# Patient Record
Sex: FEMALE | Race: WHITE
[De-identification: names, ages, dates, MRNs, and addresses within clinical notes are randomized per-mention and may not be internally consistent; named-entity substitution may affect disease eponyms.]

---

## 2019-12-03 ENCOUNTER — HOSPITAL ENCOUNTER (INPATIENT)
Dept: HOSPITAL 84 - D.SDCHOLD | Age: 54
LOS: 52 days | Discharge: HOME HEALTH | DRG: 470 | End: 2020-01-24
Attending: ORTHOPAEDIC SURGERY | Admitting: ORTHOPAEDIC SURGERY
Payer: COMMERCIAL

## 2019-12-03 VITALS
BODY MASS INDEX: 29.3 KG/M2 | HEIGHT: 63 IN | WEIGHT: 165.35 LBS | BODY MASS INDEX: 29.3 KG/M2 | WEIGHT: 165.35 LBS | BODY MASS INDEX: 29.3 KG/M2 | HEIGHT: 63 IN

## 2019-12-03 DIAGNOSIS — E11.40: ICD-10-CM

## 2019-12-03 DIAGNOSIS — E11.65: ICD-10-CM

## 2019-12-03 DIAGNOSIS — M17.12: Primary | ICD-10-CM

## 2019-12-03 DIAGNOSIS — I10: ICD-10-CM

## 2019-12-03 DIAGNOSIS — E87.1: ICD-10-CM

## 2019-12-03 DIAGNOSIS — D62: ICD-10-CM

## 2020-01-15 LAB
ANION GAP SERPL CALC-SCNC: 10.7 MMOL/L (ref 8–16)
APPEARANCE UR: CLEAR
APTT BLD: 24.6 SECONDS (ref 22.8–39.4)
BACTERIA #/AREA URNS HPF: (no result) /HPF
BASOPHILS NFR BLD AUTO: 0.3 % (ref 0–2)
BILIRUB SERPL-MCNC: NEGATIVE MG/DL
BUN SERPL-MCNC: 11 MG/DL (ref 7–18)
CALCIUM SERPL-MCNC: 9.5 MG/DL (ref 8.5–10.1)
CHLORIDE SERPL-SCNC: 101 MMOL/L (ref 98–107)
CO2 SERPL-SCNC: 33.2 MMOL/L (ref 21–32)
COLOR UR: YELLOW
CREAT SERPL-MCNC: 0.6 MG/DL (ref 0.6–1.3)
EOSINOPHIL NFR BLD: 3.1 % (ref 0–7)
ERYTHROCYTE [DISTWIDTH] IN BLOOD BY AUTOMATED COUNT: 14.4 % (ref 11.5–14.5)
GLUCOSE SERPL-MCNC: 1000 MG/DL
GLUCOSE SERPL-MCNC: 160 MG/DL (ref 74–106)
HCT VFR BLD CALC: 37.4 % (ref 36–48)
HGB BLD-MCNC: 12.3 G/DL (ref 12–16)
IMM GRANULOCYTES NFR BLD: 0.4 % (ref 0–5)
INR PPP: 0.93 (ref 0.85–1.17)
KETONES UR STRIP-MCNC: NEGATIVE MG/DL
LYMPHOCYTES NFR BLD AUTO: 28.4 % (ref 15–50)
MCH RBC QN AUTO: 30 PG (ref 26–34)
MCHC RBC AUTO-ENTMCNC: 32.9 G/DL (ref 31–37)
MCV RBC: 91.2 FL (ref 80–100)
MONOCYTES NFR BLD: 7.4 % (ref 2–11)
NEUTROPHILS NFR BLD AUTO: 60.4 % (ref 40–80)
NITRITE UR-MCNC: NEGATIVE MG/ML
OSMOLALITY SERPL CALC.SUM OF ELEC: 280 MOSM/KG (ref 275–300)
PH UR STRIP: 6 [PH] (ref 5–6)
PLATELET # BLD: 279 10X3/UL (ref 130–400)
PMV BLD AUTO: 9.6 FL (ref 7.4–10.4)
POTASSIUM SERPL-SCNC: 4.9 MMOL/L (ref 3.5–5.1)
PROT UR-MCNC: NEGATIVE MG/DL
PROTHROMBIN TIME: 12.4 SECONDS (ref 11.6–15)
RBC # BLD AUTO: 4.1 10X6/UL (ref 4–5.4)
RBC #/AREA URNS HPF: (no result) /HPF (ref 0–5)
SODIUM SERPL-SCNC: 140 MMOL/L (ref 136–145)
SP GR UR STRIP: 1.01 (ref 1–1.02)
SQUAMOUS #/AREA URNS HPF: (no result) /HPF (ref 0–5)
UROBILINOGEN UR-MCNC: NORMAL MG/DL
WBC # BLD AUTO: 6.9 10X3/UL (ref 4.8–10.8)
WBC #/AREA URNS HPF: (no result) /HPF

## 2020-01-20 VITALS — SYSTOLIC BLOOD PRESSURE: 144 MMHG | DIASTOLIC BLOOD PRESSURE: 74 MMHG

## 2020-01-20 VITALS — SYSTOLIC BLOOD PRESSURE: 132 MMHG | DIASTOLIC BLOOD PRESSURE: 60 MMHG

## 2020-01-20 VITALS — SYSTOLIC BLOOD PRESSURE: 148 MMHG | DIASTOLIC BLOOD PRESSURE: 55 MMHG

## 2020-01-20 VITALS — SYSTOLIC BLOOD PRESSURE: 127 MMHG | DIASTOLIC BLOOD PRESSURE: 88 MMHG

## 2020-01-20 VITALS — DIASTOLIC BLOOD PRESSURE: 70 MMHG | SYSTOLIC BLOOD PRESSURE: 144 MMHG

## 2020-01-20 VITALS — DIASTOLIC BLOOD PRESSURE: 68 MMHG | SYSTOLIC BLOOD PRESSURE: 132 MMHG

## 2020-01-20 VITALS — SYSTOLIC BLOOD PRESSURE: 105 MMHG | DIASTOLIC BLOOD PRESSURE: 47 MMHG

## 2020-01-20 VITALS — DIASTOLIC BLOOD PRESSURE: 60 MMHG | SYSTOLIC BLOOD PRESSURE: 130 MMHG

## 2020-01-20 VITALS — SYSTOLIC BLOOD PRESSURE: 139 MMHG | DIASTOLIC BLOOD PRESSURE: 68 MMHG

## 2020-01-20 VITALS — DIASTOLIC BLOOD PRESSURE: 76 MMHG | SYSTOLIC BLOOD PRESSURE: 142 MMHG

## 2020-01-20 VITALS — SYSTOLIC BLOOD PRESSURE: 138 MMHG | DIASTOLIC BLOOD PRESSURE: 78 MMHG

## 2020-01-20 LAB
APPEARANCE UR: CLEAR
BACTERIA #/AREA URNS HPF: (no result) /HPF
BILIRUB SERPL-MCNC: NEGATIVE MG/DL
COLOR UR: YELLOW
GLUCOSE SERPL-MCNC: 100 MG/DL
KETONES UR STRIP-MCNC: NEGATIVE MG/DL
MUCOUS THREADS #/AREA URNS LPF: (no result) /LPF
NITRITE UR-MCNC: NEGATIVE MG/ML
PH UR STRIP: 7 [PH] (ref 5–6)
PROT UR-MCNC: NEGATIVE MG/DL
RBC #/AREA URNS HPF: (no result) /HPF (ref 0–5)
SP GR UR STRIP: 1.01 (ref 1–1.02)
SQUAMOUS #/AREA URNS HPF: (no result) /HPF (ref 0–5)
UROBILINOGEN UR-MCNC: NORMAL MG/DL
WBC #/AREA URNS HPF: (no result) /HPF

## 2020-01-20 PROCEDURE — 0SRD0J9 REPLACEMENT OF LEFT KNEE JOINT WITH SYNTHETIC SUBSTITUTE, CEMENTED, OPEN APPROACH: ICD-10-PCS | Performed by: ORTHOPAEDIC SURGERY

## 2020-01-20 NOTE — NUR
CALLED FOR POST OP XRAY. ICE APPLIED AS ORDERED. PT STABLE AND AWAKE
READY TO GO TO HER ROOM WILL CALL REPORT AND TRANSFER.

## 2020-01-20 NOTE — NUR
PATIENT ADMITTED TO ROOM 2204. ADMISSION COMPLETE. DRSG TO LEFT KNEE C/D/I. NO
C/O PAIN. BED ALARM ON.  AT BEDSIDE. CALL BELL AND PERSONAL ITEMS IN
REACH. WILL CONTINUE TO MONITOR.

## 2020-01-20 NOTE — NUR
RESTING IN BED. ASSISTED TO USE BEDPAN. POST OP VITALS REMAIN STABLE. DENIES
NEEDS.  AT BEDSIDE. WILL CONTINUE TO MONITOR.

## 2020-01-20 NOTE — NUR
PATIENT ALERT AND ORIENTED SITTING
UP IN BED. NO ACUTE DISTRESS NOTED AT THIS TIME. PATIENT ASKED
FOR A PAIN PILL, BUT HAS NO OTHER NEEDS AT THIS TIME. IV R HAND, 1/2NS @100,
NO REDNESS OR SWELLING NOTED. BED RAILS X2. CALL LIGHT AND BEDSIDE TABLE
WITHIN REACH.

## 2020-01-20 NOTE — NUR
RESTING IN BED. DENIES NEEDS. FALL PRECAUTIONS IN PLACE. BED LOW. CALL CHEN
AND PERSONAL ITEMS IN [facility].

## 2020-01-21 VITALS — SYSTOLIC BLOOD PRESSURE: 115 MMHG | DIASTOLIC BLOOD PRESSURE: 61 MMHG

## 2020-01-21 VITALS — DIASTOLIC BLOOD PRESSURE: 56 MMHG | SYSTOLIC BLOOD PRESSURE: 98 MMHG

## 2020-01-21 VITALS — DIASTOLIC BLOOD PRESSURE: 66 MMHG | SYSTOLIC BLOOD PRESSURE: 151 MMHG

## 2020-01-21 VITALS — SYSTOLIC BLOOD PRESSURE: 138 MMHG | DIASTOLIC BLOOD PRESSURE: 59 MMHG

## 2020-01-21 VITALS — SYSTOLIC BLOOD PRESSURE: 143 MMHG | DIASTOLIC BLOOD PRESSURE: 63 MMHG

## 2020-01-21 VITALS — DIASTOLIC BLOOD PRESSURE: 63 MMHG | SYSTOLIC BLOOD PRESSURE: 154 MMHG

## 2020-01-21 LAB
ANION GAP SERPL CALC-SCNC: 10.1 MMOL/L (ref 8–16)
BASOPHILS NFR BLD AUTO: 0.1 % (ref 0–2)
BUN SERPL-MCNC: 14 MG/DL (ref 7–18)
CALCIUM SERPL-MCNC: 7.4 MG/DL (ref 8.5–10.1)
CHLORIDE SERPL-SCNC: 100 MMOL/L (ref 98–107)
CO2 SERPL-SCNC: 26.9 MMOL/L (ref 21–32)
CREAT SERPL-MCNC: 0.7 MG/DL (ref 0.6–1.3)
EOSINOPHIL NFR BLD: 0.4 % (ref 0–7)
ERYTHROCYTE [DISTWIDTH] IN BLOOD BY AUTOMATED COUNT: 14 % (ref 11.5–14.5)
GLUCOSE SERPL-MCNC: 273 MG/DL (ref 74–106)
HCT VFR BLD CALC: 26.3 % (ref 36–48)
HGB BLD-MCNC: 8.6 G/DL (ref 12–16)
IMM GRANULOCYTES NFR BLD: 0.3 % (ref 0–5)
LYMPHOCYTES NFR BLD AUTO: 16.2 % (ref 15–50)
MAGNESIUM SERPL-MCNC: 1.3 MG/DL (ref 1.8–2.4)
MCH RBC QN AUTO: 29.8 PG (ref 26–34)
MCHC RBC AUTO-ENTMCNC: 32.7 G/DL (ref 31–37)
MCV RBC: 91 FL (ref 80–100)
MONOCYTES NFR BLD: 12.8 % (ref 2–11)
NEUTROPHILS NFR BLD AUTO: 70.2 % (ref 40–80)
OSMOLALITY SERPL CALC.SUM OF ELEC: 276 MOSM/KG (ref 275–300)
PLATELET # BLD: 202 10X3/UL (ref 130–400)
PMV BLD AUTO: 9.6 FL (ref 7.4–10.4)
POTASSIUM SERPL-SCNC: 4 MMOL/L (ref 3.5–5.1)
RBC # BLD AUTO: 2.89 10X6/UL (ref 4–5.4)
SODIUM SERPL-SCNC: 133 MMOL/L (ref 136–145)
WBC # BLD AUTO: 7.5 10X3/UL (ref 4.8–10.8)

## 2020-01-21 NOTE — NUR
TAKEN OFF CPM AT THIS TIME. ASSISTED ONTO BEDPAN. DENIES PAIN. SCD PLACED ON
RLE. PLEXI BOOT PLACED TO LT FOOT. DRSG TO LT KNEE WITH ACE WRAP IS C/D/I. BED
ALARM ON FOR PT SAFETY. NS @ 10 ML/HR INFUSING IN RT HAND. ALERT AND ORIENTED
X4. C/O NAUSEA. SR ELEVATED X2. CL IN REACH.

## 2020-01-21 NOTE — NUR
SPOKE WITH NOLBERTO AN TO GET LOW RESISTANCE SLIDING SCALE AND HUMALOG FOR
PATIENT. STATES CHECK BS AC/HS. BLOOD SUGAR 390 AFTER CHECKING TWICE. 10 UNITS
INSULIN GIVEN.

## 2020-01-21 NOTE — NUR
ALERT AND ORIENTED. LUNGS CLEAR BILATERALLY. HEART SOUNDS S1 AND S2 HEARD IN
ALL FIELDS. BOWEL SOUNDS ACTIVE X 4. DRSG TO LEFT KNEE C/D/I. REQUESTED AND
GIVEN ICE PACK FOR KNEE. USING CPM MACHINE. TO COME OFF AT 0930. IV TO RIGHT
HAND PATENT WITHOUT REDNESS. DENIES NEEDS. BED LOW. FALL PRECAUTIONS IN PLACE.
CALL BELL AND PERSONAL ITEMS IN REACH. WILL CONTINUE TO MONITOR.

## 2020-01-22 VITALS — DIASTOLIC BLOOD PRESSURE: 64 MMHG | SYSTOLIC BLOOD PRESSURE: 104 MMHG

## 2020-01-22 VITALS — SYSTOLIC BLOOD PRESSURE: 145 MMHG | DIASTOLIC BLOOD PRESSURE: 70 MMHG

## 2020-01-22 VITALS — SYSTOLIC BLOOD PRESSURE: 130 MMHG | DIASTOLIC BLOOD PRESSURE: 84 MMHG

## 2020-01-22 VITALS — DIASTOLIC BLOOD PRESSURE: 65 MMHG | SYSTOLIC BLOOD PRESSURE: 148 MMHG

## 2020-01-22 VITALS — DIASTOLIC BLOOD PRESSURE: 77 MMHG | SYSTOLIC BLOOD PRESSURE: 129 MMHG

## 2020-01-22 VITALS — DIASTOLIC BLOOD PRESSURE: 82 MMHG | SYSTOLIC BLOOD PRESSURE: 135 MMHG

## 2020-01-22 LAB
ANION GAP SERPL CALC-SCNC: 13.6 MMOL/L (ref 8–16)
BASOPHILS NFR BLD AUTO: 0.1 % (ref 0–2)
BUN SERPL-MCNC: 8 MG/DL (ref 7–18)
CALCIUM SERPL-MCNC: 8.3 MG/DL (ref 8.5–10.1)
CHLORIDE SERPL-SCNC: 96 MMOL/L (ref 98–107)
CO2 SERPL-SCNC: 26.6 MMOL/L (ref 21–32)
CREAT SERPL-MCNC: 0.5 MG/DL (ref 0.6–1.3)
EOSINOPHIL NFR BLD: 0 % (ref 0–7)
ERYTHROCYTE [DISTWIDTH] IN BLOOD BY AUTOMATED COUNT: 13.6 % (ref 11.5–14.5)
GLUCOSE SERPL-MCNC: 231 MG/DL (ref 74–106)
HCT VFR BLD CALC: 26.1 % (ref 36–48)
HGB BLD-MCNC: 8.6 G/DL (ref 12–16)
IMM GRANULOCYTES NFR BLD: 0.4 % (ref 0–5)
LYMPHOCYTES NFR BLD AUTO: 14.4 % (ref 15–50)
MAGNESIUM SERPL-MCNC: 1.4 MG/DL (ref 1.8–2.4)
MCH RBC QN AUTO: 29.8 PG (ref 26–34)
MCHC RBC AUTO-ENTMCNC: 33 G/DL (ref 31–37)
MCV RBC: 90.3 FL (ref 80–100)
MONOCYTES NFR BLD: 11.3 % (ref 2–11)
NEUTROPHILS NFR BLD AUTO: 73.8 % (ref 40–80)
OSMOLALITY SERPL CALC.SUM OF ELEC: 269 MOSM/KG (ref 275–300)
PLATELET # BLD: 232 10X3/UL (ref 130–400)
PMV BLD AUTO: 9.9 FL (ref 7.4–10.4)
POTASSIUM SERPL-SCNC: 4.2 MMOL/L (ref 3.5–5.1)
RBC # BLD AUTO: 2.89 10X6/UL (ref 4–5.4)
SODIUM SERPL-SCNC: 132 MMOL/L (ref 136–145)
WBC # BLD AUTO: 11.2 10X3/UL (ref 4.8–10.8)

## 2020-01-22 NOTE — NUR
ALERT RESTING IN BED CPM IN USE, DENIES PAIN OR NEEDS AT THIS TIME, SEE SHIFT
ASSESSMENT, CALL LIGHT IN REACH

## 2020-01-23 VITALS — DIASTOLIC BLOOD PRESSURE: 70 MMHG | SYSTOLIC BLOOD PRESSURE: 135 MMHG

## 2020-01-23 VITALS — SYSTOLIC BLOOD PRESSURE: 146 MMHG | DIASTOLIC BLOOD PRESSURE: 67 MMHG

## 2020-01-23 VITALS — SYSTOLIC BLOOD PRESSURE: 133 MMHG | DIASTOLIC BLOOD PRESSURE: 70 MMHG

## 2020-01-23 VITALS — DIASTOLIC BLOOD PRESSURE: 70 MMHG | SYSTOLIC BLOOD PRESSURE: 140 MMHG

## 2020-01-23 VITALS — SYSTOLIC BLOOD PRESSURE: 147 MMHG | DIASTOLIC BLOOD PRESSURE: 66 MMHG

## 2020-01-23 LAB
ANION GAP SERPL CALC-SCNC: 8.3 MMOL/L (ref 8–16)
BASOPHILS NFR BLD AUTO: 0.2 % (ref 0–2)
BUN SERPL-MCNC: 8 MG/DL (ref 7–18)
CALCIUM SERPL-MCNC: 8.6 MG/DL (ref 8.5–10.1)
CHLORIDE SERPL-SCNC: 97 MMOL/L (ref 98–107)
CO2 SERPL-SCNC: 31.6 MMOL/L (ref 21–32)
CREAT SERPL-MCNC: 0.5 MG/DL (ref 0.6–1.3)
EOSINOPHIL NFR BLD: 0 % (ref 0–7)
ERYTHROCYTE [DISTWIDTH] IN BLOOD BY AUTOMATED COUNT: 13.5 % (ref 11.5–14.5)
GLUCOSE SERPL-MCNC: 161 MG/DL (ref 74–106)
HCT VFR BLD CALC: 24 % (ref 36–48)
HGB BLD-MCNC: 8 G/DL (ref 12–16)
IMM GRANULOCYTES NFR BLD: 0.6 % (ref 0–5)
LYMPHOCYTES NFR BLD AUTO: 12 % (ref 15–50)
MAGNESIUM SERPL-MCNC: 1.5 MG/DL (ref 1.8–2.4)
MCH RBC QN AUTO: 30.1 PG (ref 26–34)
MCHC RBC AUTO-ENTMCNC: 33.3 G/DL (ref 31–37)
MCV RBC: 90.2 FL (ref 80–100)
MONOCYTES NFR BLD: 10.4 % (ref 2–11)
NEUTROPHILS NFR BLD AUTO: 76.8 % (ref 40–80)
OSMOLALITY SERPL CALC.SUM OF ELEC: 266 MOSM/KG (ref 275–300)
PLATELET # BLD: 252 10X3/UL (ref 130–400)
PMV BLD AUTO: 9.7 FL (ref 7.4–10.4)
POTASSIUM SERPL-SCNC: 3.9 MMOL/L (ref 3.5–5.1)
RBC # BLD AUTO: 2.66 10X6/UL (ref 4–5.4)
SODIUM SERPL-SCNC: 133 MMOL/L (ref 136–145)
WBC # BLD AUTO: 11.8 10X3/UL (ref 4.8–10.8)

## 2020-01-23 NOTE — MORECARE
CASE MANAGEMENT DISCHARGE SUMMARY
 
 
PATIENT: YNES COLEMAN                      UNIT: [id number]
ACCOUNT#: [id number]                       ADM DATE: 20
AGE: 54     : 61  SEX: F            ROOM/BED: D.2204    
AUTHOR: AKUA,DOC                             PHYSICIAN:                               
 
REFERRING PHYSICIAN: NAHED ALVARES MD        
DATE OF SERVICE: 20
Discharge Plan
 
 
Patient Name: YNES COLEMAN
Facility: [facility]:[place]
Encounter #: [id number]
Medical Record #: [id number]
: 1965
Planned Disposition: Home Health Service
Anticipated Discharge Date: 
 
Discharge Date: 
Expected LOS: 
Initial Reviewer: [email or web address]
Initial Review Date: 2020
Generated: 20   1:22 pm 
Comments
 
DCP- Discharge Planning
 
Updated by [id number]: Yvonne Arnold on 20  11:22 am CT
Patient Name: YNES COLEMAN                                     
Admission Status: Elective   
Accout number: [id number]                              
Admission Date: 2020   
: 1965                                                        
Admission Diagnosis:   
Attending: NAHED ALVARES                                               
Current LOS:  3   
  
Anticipated DC Date:    
Planned Disposition: Home Health Service   
Primary Insurance: [facility]   
  
  
Discharge Planning Comments:   
  
CM met with patient to complete initial dc planning assessment.  CM educated 
patient on the CM role and verbal consent given by patient to complete 
assessment.   Patient lives at home with her spouse Deandre.  At discharge 
 
patient plans to return home and feels this is a safe discharge. Patient 
states she has adequate assistance and support from her spouse to return home.
 CM discussed availability of home health, and patient has chosen  [facility].  CM will continue to follow and will assist as needed with dc 
plans/needs.    
  
  
  
  
: Yvonne Arnold
DCP- Discharge Planning
 
Updated by [id number]: Yvonne Arnold on 20   7:21 am CT
CM spoke with patient about H/H. H/H explained- Patient chose [facility]. Copy placed on chart.
DCP- Discharge Planning
 
Updated by [id number]: Yvonne Arnold on 20   2:43 pm CT
Cm spoke with patient about HH.  Patient reluctant to choose facility without 
.  Called Deandre () but unable to reach.  Will attempt 19 
for home health decision.
 DCPIA - Discharge Planning Initial Assessment
 
Updated by [id number]: Yvonne Arnold on 20  12:19 pm
*  Is the patient Alert and Oriented?
Yes
*  How many steps to enter\exit or inside your home? 1/0 *  [facility] *  Pharmacy [facility]
*  Preadmission Environment
Home with Family
*  ADLs
Independent
*  Equipment
Cane
Elevated Toliet Seat
Glucometer
Rolling Walker
Walker
*  List name and contact numbers for known caregivers / representatives who 
currently or will assist patient after discharge:
Deandre Coleman  [phone]
 
*  Verbal permission to speak to the caregivers and representatives has been 
obtained from the patient.
Yes
*  Community resources currently utilized
None
*  Additional services required to return to the preadmission environment?
Yes
*  Can the patient safely return to the preadmission environment?
Yes
*  Has this patient been hospitalized within the prior 30 days at any 
 
hospital?
No
 
 
 
 
 
Coverage Notice
 
Reviewer: [email or web address] - Yvonne Arnold
 
Notice Issued Date-Time: 2020   7:40
Notice Type: Patient Choice Letter
 
Notice Delivered To: Patient
Relationship to Patient: 
Representative Name: 
 
Delivery Method: HAND - Hand Delivered
Juhi Days:
Prior Verbal Notification: 
 
Recipient Understood Notice: Yes
Recipient Signature: Yes
Med Rec Note Co-signed by Attending:
 
Coverage Notice Comment:  patient chooses [facility]
 
Last DP export: 20  11:07 a
Patient Name: YNES COLEMAN
Encounter #: [id number]
Page [id number]
 
 
 
 
 
Electronically Signed by KELVIN FATIMA on 20 at 1223
 
 
 
 
 
 
**All edits/amendments must be made on the electronic document**
 
DICTATION DATE: 20 122     : IRINEO  20 1222     
RPT#: [id number]                                DC DATE:        
                                               STATUS: ADM IN  
[facility]
[address] [place] [address]
***END OF REPORT***

## 2020-01-23 NOTE — MORECARE
CASE MANAGEMENT DISCHARGE SUMMARY
 
 
PATIENT: YNES COLEMAN                      UNIT: [id number]
ACCOUNT#: [id number]                       ADM DATE: 20
AGE: 54     : 61  SEX: F            ROOM/BED: D.2204    
AUTHOR: AKUA,DOC                             PHYSICIAN:                               
 
REFERRING PHYSICIAN: NAHED ALVARES MD        
DATE OF SERVICE: 20
Discharge Plan
 
 
Patient Name: YNES COLEMAN
Facility: [facility]:[place]
Encounter #: [id number]
Medical Record #: [id number]
: 1965
Planned Disposition: Home Health Service
Anticipated Discharge Date: 
 
Discharge Date: 
Expected LOS: 
Initial Reviewer: [id number]
Initial Review Date: 2020
Generated: 20   4:47 pm 
Comments
 
DCP- Discharge Planning
 
Updated by [id number]: Yvonne Arnold on 20  11:22 am CT
Patient Name: YNES COLEMAN                                     
Admission Status: Elective   
Accout number: [id number]                              
Admission Date: 2020   
: 1965                                                        
Admission Diagnosis:   
Attending: NAHED ALVARES                                               
Current LOS:  3   
  
Anticipated DC Date:    
Planned Disposition: Home Health Service   
Primary Insurance: [facility]   
  
  
Discharge Planning Comments:   
  
CM met with patient to complete initial dc planning assessment.  CM educated 
patient on the CM role and verbal consent given by patient to complete 
assessment.   Patient lives at home with her spouse Deandre.  At discharge 
 
patient plans to return home and feels this is a safe discharge. Patient 
states she has adequate assistance and support from her spouse to return home.
 CM discussed availability of home health, and patient has chosen  [facility].  CM will continue to follow and will assist as needed with dc 
plans/needs.    
  
  
  
  
: Yvonne Arnold
DCP- Discharge Planning
 
Updated by [id number]: Yvonne Arnold on 20   7:21 am CT
CM spoke with patient about H/H. H/H explained- Patient chose [facility]. Copy placed on chart.
DCP- Discharge Planning
 
Updated by [id number]: Yvonne Arnold on 20   2:43 pm CT
Cm spoke with patient about HH.  Patient reluctant to choose facility without 
.  Called Deandre () but unable to reach.  Will attempt 19 
for home health decision.
 DCPIA - Discharge Planning Initial Assessment
 
Updated by [id number]: Yvonne Arnold on 20  12:19 pm
*  Is the patient Alert and Oriented?
Yes
*  How many steps to enter\exit or inside your home? 1/0 *  [facility] *  Pharmacy [facility]
*  Preadmission Environment
Home with Family
*  ADLs
Independent
*  Equipment
Cane
Elevated Toliet Seat
Glucometer
Rolling Walker
Walker
*  List name and contact numbers for known caregivers / representatives who 
currently or will assist patient after discharge:
Deandre Coleman  [phone]
 
*  Verbal permission to speak to the caregivers and representatives has been 
obtained from the patient.
Yes
*  Community resources currently utilized
None
*  Additional services required to return to the preadmission environment?
Yes
*  Can the patient safely return to the preadmission environment?
Yes
*  Has this patient been hospitalized within the prior 30 days at any 
 
hospital?
No
 
External Providers
External Provider: [facility]
 
Next Contact Date: 
Service Request Date: 
Service Type: 
Resolution: 
 
Reviewer: 
Comments: 
 
 
 
 
Coverage Notice
 
Reviewer: [id number] - Yvonne Arnold
 
Notice Issued Date-Time: 2020   7:40
Notice Type: Patient Choice Letter
 
Notice Delivered To: Patient
Relationship to Patient: 
Representative Name: 
 
Delivery Method: HAND - Hand Delivered
Juhi Days:
Prior Verbal Notification: 
 
Recipient Understood Notice: Yes
Recipient Signature: Yes
Med Rec Note Co-signed by Attending:
 
Coverage Notice Comment:  [facility]
 
Last DP export: 20  11:23 a
Patient Name: YNES COLEMAN
 
Encounter #: [id number]
Page [id number]
 
 
 
 
 
Electronically Signed by KELVIN FATIMA on 20 at 1547
 
 
 
 
 
 
**All edits/amendments must be made on the electronic document**
 
DICTATION DATE: 20 1547     : IRINEO  20 1547     
RPT#: [id number]                                DC DATE:        
                                               STATUS: ADM IN  
[facility]
[address] [place] [address]
***END OF REPORT***

## 2020-01-23 NOTE — NUR
ALERT RESTING IN BED, CPM IN USE, DENIES PAIN OR NEEDS AT THIS TIME, SEE SHIFT
ASSESSMENT, CAll light in reach

## 2020-01-23 NOTE — MORECARE
CASE MANAGEMENT DISCHARGE SUMMARY
 
 
PATIENT: YNES GONZALES                      UNIT: [id number]
ACCOUNT#: [id number]                       ADM DATE: 20
AGE: 54     : 61  SEX: F            ROOM/BED: D.2204    
AUTHOR: KELVIN FATIMA                             PHYSICIAN:                               
 
REFERRING PHYSICIAN: NAHED ALVARES MD        
DATE OF SERVICE: 20
Discharge Plan
 
 
Patient Name: YNES GONZALES
[facility]
Encounter #: [id number]
Medical Record #: [id number]
: 1965
Planned Disposition: Home Health Service
Anticipated Discharge Date: 
 
Discharge Date: 
Expected LOS: 
Initial Reviewer: [id number]
Initial Review Date: 2020
Generated: 20  12:57 pm 
Comments
 
DCP- Discharge Planning
 
Updated by [id number]: Yvonne Arnold on 20   7:21 am CT
CM spoke with patient about H/H. H/H explained- Patient chose [facility]. Copy placed on chart.
DCP- Discharge Planning
 
Updated by [id number]: Yvonne Arnold on 20   2:43 pm CT
Cm spoke with patient about HH.  Patient reluctant to choose facility without 
.  Called Deandre () but unable to reach.  Will attempt 19 
for home health decision.
  
 
 
 
 
 
 
Coverage Notice
 
Reviewer: [id number] - Yvonne Arnold
 
 
Notice Issued Date-Time: 2020   7:40
Notice Type: Patient Choice Letter
 
Notice Delivered To: Patient
Relationship to Patient: 
Representative Name: 
 
Delivery Method: HAND - Hand Delivered
Juhi Days:
Prior Verbal Notification: 
 
Recipient Understood Notice: Yes
Recipient Signature: Yes
Med Rec Note Co-signed by Attending:
 
Coverage Notice Comment:  [facility] chooses [facility]
Patient Name: YNES GONZALES
Encounter #: [id number]
Page [id number]
 
 
 
 
 
Electronically Signed by KELVIN FATIMA on 20 at 1158
 
 
 
 
 
 
**All edits/amendments must be made on the electronic document**
 
DICTATION DATE: 20 1157     : IRINEO  20 1157     
RPT#: [id number]                                DC DATE:        
                                               STATUS: ADM IN  
[facility]
[address] [place] [address]
***END OF REPORT***

## 2020-01-23 NOTE — OP
PATIENT NAME:  YNES GONZALES                            MEDICAL RECORD: [id number]
:[birth year]                                             LOCATION:D.MS [id number]
                                                         ADMISSION DATE:20
SURGEON:  NAHED ALVARES MD        
 
 
DATE OF OPERATION:  2020
 
PREOPERATIVE DIAGNOSIS:  Degenerative arthritis, left knee.
 
POSTOPERATIVE DIAGNOSIS:  Degenerative arthritis, left knee.
 
PROCEDURE:  Left total knee arthroplasty.
 
SURGEON:  Nahed Alvares MD
 
ASSISTANT:  LEIGH Camarillo
 
INTRAOPERATIVE COMPLICATIONS:  None.
 
SUMMARY OF PATHOLOGIC FINDINGS:  The patient had severe medial compartment
osteoarthritis with patellofemoral arthrosis.
 
IMPLANTS USED:  [place] triathlon total knee arthroplasty, press fit a size 3
tibial baseplate, size 3 distal femur, size 3 x 11 press fit patellar component,
and a size 11 tibial insert.  Again, press fit.
 
OPERATIVE SUMMARY IN DETAIL:  After obtaining the appropriate preoperative
orthopedic surgery consent as well as anesthetic consultation ,evaluation, and
clearance, the patient was brought to the operating room and placed on the
operating table in supine position.  After general laryngeal mask airway was
administered, tourniquet was placed in the proximal aspect of left lower
extremity.  Left lower extremity was prepped and draped in routine sterile
fashion.  At this time, the appropriate timeout was taken.  Based upon the
patient's unique identifiers and agreed upon by all.  The leg was elevated and
exsanguinated, tourniquet was inflated to 350 mmHg.  Routine midline incision
was taken down for paramedian arthrotomy.  The patella was everted and soft
tissue excision was done in the usual fashion.  Distal intramedullary guide hole
was created for distal femoral cuts.  Having completed this, the proximal tibia
was completely exposed.  The residual soft tissue meniscotome were removed and
proximal tibial cut was made.  Having completed this, appropriate measurements
were taken.  Chamfer cuts made on the distal femur.  Trials were put in
corresponding to the above final implants, taken through range of motion and
found to be stable in all planes.  Distal femur and proximal tibial preparations
were made for press fitting.  At this point, the arthritic patellar surface was
excised and again prepared for press fit of the patellar resurfacing component. 
The entire knee cavity was irrigated and the bone ends were dried.  Final
components were put into place with good fit and fill knee was taken through
range of motion and found to be stable in all planes.  A gram of vancomycin, a
gram of tobramycin was placed into the joint.  The paramedian arthrotomy was
closed with #2 Ethibond by Mart Tapia.  Following this, he closed the skin with
#1 Vicryl, #2 Vicryl, and skin staples.  Sterile dressings were applied. 
Tourniquet was deflated.  The patient was awakened, taken to the recovery room
in stable condition.  All final needle and sponge counts were correct.
 
TRANSINT:[id number] Voice Confirmation ID: [id number] DOCUMENT ID: [id number]
 
 
 
OPERATIVE REPORT                               [id number]    YNES GONZALES MD, NAHED NARANJO        
 
 
 
Electronically Signed by NAHED ALVARES MD on 20 at 0820
 
 
 
 
 
 
 
 
 
 
 
 
 
 
 
 
 
 
 
 
 
 
 
 
 
 
 
 
 
 
 
 
 
 
 
 
 
 
 
 
 
CC:                                                             [phone]
DICTATION DATE: 20 0936     :     20 1220      ADM IN  
                                                                              
[place] [id number] [place] [id number]

## 2020-01-23 NOTE — MORECARE
CASE MANAGEMENT DISCHARGE SUMMARY
 
 
PATIENT: YNES GONZALES                      UNIT: [id number]
ACCOUNT#: [id number]                       ADM DATE: 20
AGE: 54     : 61  SEX: F            ROOM/BED: D.2204    
AUTHOR: KELVIN FATIMA                             PHYSICIAN:                               
 
REFERRING PHYSICIAN: NAHED ALVARES MD        
DATE OF SERVICE: 20
Discharge Plan
 
 
Patient Name: YNES GONZALES
[facility]
Encounter #: [id number]
Medical Record #: [id number]
: 1965
Planned Disposition: Home Health Service
Anticipated Discharge Date: 
 
Discharge Date: 
Expected LOS: 
Initial Reviewer: [id number]
Initial Review Date: 2020
Generated: 20   1:06 pm 
Comments
 
DCP- Discharge Planning
 
Updated by [id number]: Yvonne Arnold on 20   7:21 am CT
CM spoke with patient about H/H. H/H explained- Patient chose [facility]. Copy placed on chart.
DCP- Discharge Planning
 
Updated by [id number]: Yvonne Arnold on 20   2:43 pm CT
Cm spoke with patient about HH.  Patient reluctant to choose facility without 
.  Called Deandre () but unable to reach.  Will attempt 19 
for home health decision.
  
 
 
 
 
 
 
Coverage Notice
 
Reviewer: [email or web address] - Yvonne Arnold
 
 
Notice Issued Date-Time: 2020   7:40
Notice Type: Patient Choice Letter
 
Notice Delivered To: Patient
Relationship to Patient: 
Representative Name: 
 
Delivery Method: HAND - Hand Delivered
Juhi Days:
Prior Verbal Notification: 
 
Recipient Understood Notice: Yes
Recipient Signature: Yes
Med Rec Note Co-signed by Attending:
 
Coverage Notice Comment:  patient chooses [facility]
 
Last DP export: 20  10:58 a
Patient Name: YNES GONZALES
Encounter #: [id number]
Page [phone]
 
 
 
 
 
Electronically Signed by KELVIN FATIMA on 20 at 1207
 
 
 
 
 
 
**All edits/amendments must be made on the electronic document**
 
DICTATION DATE: 20 1206     : IRINEO  20 1206     
RPT#: [id number]                                DC DATE:        
                                               STATUS: ADM IN  
[facility]
[address] [place] [address]
***END OF REPORT***

## 2020-01-23 NOTE — NUR
PT RESTING IN BED WITH CPM TO LEFT LOWER EXTREMITY.  RATES PAIN 3/10 AT THIS
TIME.  DRESSING C/D/I TO LEFT LOWER EXTREMITY.  SALINE LOC TO RIGHT HAND, SITE
WITHOUT REDNESS OR EDEMA.  DENIES FURTHER NEEDS AT THIS TIME.  CL WITHIN
REACH.  ENCOURAGED TO CALL WITH NEEDS.  CONTINUE POC

## 2020-01-24 VITALS — DIASTOLIC BLOOD PRESSURE: 74 MMHG | SYSTOLIC BLOOD PRESSURE: 110 MMHG

## 2020-01-24 VITALS — SYSTOLIC BLOOD PRESSURE: 128 MMHG | DIASTOLIC BLOOD PRESSURE: 64 MMHG

## 2020-01-24 LAB
ANION GAP SERPL CALC-SCNC: 8.9 MMOL/L (ref 8–16)
BASOPHILS NFR BLD AUTO: 0.1 % (ref 0–2)
BUN SERPL-MCNC: 10 MG/DL (ref 7–18)
CALCIUM SERPL-MCNC: 8.5 MG/DL (ref 8.5–10.1)
CHLORIDE SERPL-SCNC: 98 MMOL/L (ref 98–107)
CO2 SERPL-SCNC: 32.1 MMOL/L (ref 21–32)
CREAT SERPL-MCNC: 0.7 MG/DL (ref 0.6–1.3)
EOSINOPHIL NFR BLD: 0.5 % (ref 0–7)
ERYTHROCYTE [DISTWIDTH] IN BLOOD BY AUTOMATED COUNT: 13.9 % (ref 11.5–14.5)
GLUCOSE SERPL-MCNC: 190 MG/DL (ref 74–106)
HCT VFR BLD CALC: 28 % (ref 36–48)
HGB BLD-MCNC: 9.3 G/DL (ref 12–16)
IMM GRANULOCYTES NFR BLD: 0.8 % (ref 0–5)
LYMPHOCYTES NFR BLD AUTO: 18.3 % (ref 15–50)
MAGNESIUM SERPL-MCNC: 1.6 MG/DL (ref 1.8–2.4)
MCH RBC QN AUTO: 29.9 PG (ref 26–34)
MCHC RBC AUTO-ENTMCNC: 33.2 G/DL (ref 31–37)
MCV RBC: 90 FL (ref 80–100)
MONOCYTES NFR BLD: 9.5 % (ref 2–11)
NEUTROPHILS NFR BLD AUTO: 70.8 % (ref 40–80)
OSMOLALITY SERPL CALC.SUM OF ELEC: 273 MOSM/KG (ref 275–300)
PLATELET # BLD: 327 10X3/UL (ref 130–400)
PMV BLD AUTO: 9.5 FL (ref 7.4–10.4)
POTASSIUM SERPL-SCNC: 4 MMOL/L (ref 3.5–5.1)
RBC # BLD AUTO: 3.11 10X6/UL (ref 4–5.4)
SODIUM SERPL-SCNC: 135 MMOL/L (ref 136–145)
WBC # BLD AUTO: 10.3 10X3/UL (ref 4.8–10.8)

## 2020-01-24 NOTE — MORECARE
CASE MANAGEMENT DISCHARGE SUMMARY
 
 
PATIENT: YNES COLEMAN                      UNIT: [id number]
ACCOUNT#: [id number]                       ADM DATE: 20
AGE: 54     : 61  SEX: F            ROOM/BED: D.2204    
AUTHOR: AKUA,DOC                             PHYSICIAN:                               
 
REFERRING PHYSICIAN: NAHED ALVARES MD        
DATE OF SERVICE: 20
Discharge Plan
 
 
Patient Name: YNES COLEMAN
Facility: [facility]:[place]
Encounter #: [id number]
Medical Record #: [id number]
: 1965
Planned Disposition: Home Health Service
Anticipated Discharge Date: 
 
Discharge Date: 2020
Expected LOS: 
Initial Reviewer: [id number]
Initial Review Date: 2020
Generated: 20   5:29 pm 
Comments
 
DCP- Discharge Planning
 
Updated by [id number]: Jaci Young on 20  10:43 am CT
PATIENT DISCHARGED HOME TODAY WITH [facility]. I HAVE FAXED 
CLINICALS TO [facility] AND NOTIFIED NELLY.
DCP- Discharge Planning
 
Updated by [id number]: Yvonne Arnold on 20  11:22 am CT
Patient Name: YNES COLEMAN                                     
Admission Status: Elective   
Accout number: [id number]                              
Admission Date: 2020   
: 1965                                                        
Admission Diagnosis:   
Attending: NAHED ALVARES                                               
Current LOS:  3   
  
Anticipated DC Date:    
Planned Disposition: Home Health Service   
Primary Insurance: [facility]   
  
  
 
Discharge Planning Comments:   
  
CM met with patient to complete initial dc planning assessment.  CM educated 
patient on the CM role and verbal consent given by patient to complete 
assessment.   Patient lives at home with her spouse Deandre.  At discharge 
patient plans to return home and feels this is a safe discharge. Patient 
states she has adequate assistance and support from her spouse to return home.
 CM discussed availability of home health, and patient has chosen  [facility].  CM will continue to follow and will assist as needed with dc 
plans/needs.    
  
  
  
  
: Yvonne Arnold
DCP- Discharge Planning
 
Updated by [id number]: Yvonne Arnold on 20   7:21 am CT
CM spoke with patient about H/H. H/H explained- Patient chose [facility]. Copy placed on chart.
DCP- Discharge Planning
 
Updated by [id number]: Yvonne Arnold on 20   2:43 pm CT
Cm spoke with patient about HH.  Patient reluctant to choose facility without 
.  Called Deandre () but unable to reach.  Will attempt 19 
for home health decision.
 DCPIA - Discharge Planning Initial Assessment
 
Updated by [id number]: Yvonne Arnold on 20  12:19 pm
*  Is the patient Alert and Oriented?
Yes
*  How many steps to enter\exit or inside your home? 1/0 *  [facility] *  Pharmacy [facility]
*  Preadmission Environment
Home with Family
*  ADLs
Independent
*  Equipment
Cane
Elevated Toliet Seat
Glucometer
Rolling Walker
Walker
*  List name and contact numbers for known caregivers / representatives who 
currently or will assist patient after discharge:
Deandre Coleman  [phone]
 
*  Verbal permission to speak to the caregivers and representatives has been 
obtained from the patient.
Yes
*  Community resources currently utilized
None
 
*  Additional services required to return to the preadmission environment?
Yes
*  Can the patient safely return to the preadmission environment?
Yes
*  Has this patient been hospitalized within the prior 30 days at any 
hospital?
No
 
External Providers
[facility]
 
Next Contact Date: 
Service Request Date: 
Service Type: 
Resolution: 
 
Reviewer: 
Comments: 
 
 
 
 
Coverage Notice
 
Reviewer: [email or web address] - Yvonne Arnold
 
Notice Issued Date-Time: 2020   7:40
Notice Type: Patient Choice Letter
 
Notice Delivered To: Patient
Relationship to Patient: 
Representative Name: 
 
Delivery Method: HAND - Hand Delivered
Juhi Days:
Prior Verbal Notification: 
 
Recipient Understood Notice: Yes
Recipient Signature: Yes
Med Rec Note Co-signed by Attending:
 
Coverage Notice Comment:  [facility] chooses [facility]
 
Last DP export: 20  10:49 a
Patient Name: YNES COLEMAN
 
Encounter #: [id number]
Page [phone]
 
 
 
 
 
Electronically Signed by KELVIN [facility] on 20 at 1630
 
 
 
 
 
 
**All edits/amendments must be made on the electronic document**
 
DICTATION DATE: 20     : IRINEO  20     
RPT#: [id number]                                DC DATE:20
                                               STATUS: DIS IN  
[facility]
[address] [place] [address]
***END OF REPORT***

## 2020-01-24 NOTE — NUR
IV DISCONTINUED WITH ACEWRAP AND COTTON DRESSING REVOVED AQUACELL DRESSING
INTACT. VERBQALIZED UNDERSTANDING OF DISCHARGE INSTRUCTIONS AND STABLE AT TIME
OF DEPARTURE UNDER CARE OF .

## 2020-01-24 NOTE — NUR
ALERT AND ORIENTED X4 DRESSING INTACT TO LLE WITH PEDAL PULSES NOTED W/O
EDEMA. CPM REMOVED AT APPROPRIATE TIME. DENEIS ANY PAIN OR DISCOMFORT WITH
RESP EVEN AND UNLABORED.

## 2020-01-24 NOTE — MORECARE
CASE MANAGEMENT DISCHARGE SUMMARY
 
 
PATIENT: YNES COLEMAN                      UNIT: [id number]
ACCOUNT#: [id number]                       ADM DATE: 20
AGE: 54     : 61  SEX: F            ROOM/BED: D.2204    
AUTHOR: AKUA,DOC                             PHYSICIAN:                               
 
REFERRING PHYSICIAN: NAHED ALVARES MD        
DATE OF SERVICE: 20
Discharge Plan
 
 
Patient Name: YNES COLEMAN
Facility: [facility]:[place]
Encounter #: [id number]
Medical Record #: [id number]
: 1965
Planned Disposition: Home Health Service
Anticipated Discharge Date: 
 
Discharge Date: 2020
Expected LOS: 
Initial Reviewer: [id number]
Initial Review Date: 2020
Generated: 20   7:19 pm 
Comments
 
DCP- Discharge Planning
 
Updated by [id number]: Yvonne Arnold on 20   3:31 pm CT
[facility] is not in network.  Sent referral to [facility].  Called patient to 
update POC.
DCP- Discharge Planning
 
Updated by [id number]: Jaci Young on 20  10:43 am CT
[facility] I HAVE FAXED 
CLINICALS TO [facility] AND NOTIFIED NELLY.
DCP- Discharge Planning
 
Updated by [email or web address]: Yvonne Arnold on 20  11:22 am CT
Patient Name: YNES COLEMAN                                     
Admission Status: Elective   
Accout number: [id number]                              
Admission Date: 2020   
: 1965                                                        
Admission Diagnosis:   
Attending: NAHED ALVARES                                               
Current LOS:  3   
  
 
Anticipated DC Date:    
Planned Disposition: Home Health Service   
Primary Insurance: [facility]   
  
  
Discharge Planning Comments:   
  
CM met with patient to complete initial dc planning assessment.  CM educated 
patient on the CM role and verbal consent given by patient to complete 
assessment.   Patient lives at home with her spouse Deandre.  At discharge 
patient plans to return home and feels this is a safe discharge. Patient 
states she has adequate assistance and support from her spouse to return home.
 CM discussed availability of home health, and patient has chosen  [facility].  CM will continue to follow and will assist as needed with dc 
plans/needs.    
  
  
  
  
: Yvonne Arnold
DCP- Discharge Planning
 
Updated by [id number]: Yvonne Arnold on 20   7:21 am CT
CM spoke with patient about H/H. H/H explained- Patient chose [facility]. Copy placed on chart.
DCP- Discharge Planning
 
Updated by [id number]: Yvonne Arnold on 20   2:43 pm CT
Cm spoke with patient about HH.  Patient reluctant to choose facility without 
.  Called Deandre () but unable to reach.  Will attempt 19 
for home health decision.
 DCPIA - Discharge Planning Initial Assessment
 
Updated by [id number]: Yvonne Arnold on 20  12:19 pm
*  Is the patient Alert and Oriented?
Yes
*  How many steps to enter\exit or inside your home? 1/0 *  PCP [place] *  Pharmacy [facility]
*  Preadmission Environment
Home with Family
*  ADLs
Independent
*  Equipment
Cane
Elevated Toliet Seat
Glucometer
Rolling Walker
Walker
*  List name and contact numbers for known caregivers / representatives who 
currently or will assist patient after discharge:
Deandre Coleman  [phone]
 
 
*  Verbal permission to speak to the caregivers and representatives has been 
obtained from the patient.
Yes
*  Community resources currently utilized
None
*  Additional services required to return to the preadmission environment?
Yes
*  Can the patient safely return to the preadmission environment?
Yes
*  Has this patient been hospitalized within the prior 30 days at any 
hospital?
No
 
 
 
 
 
Coverage Notice
 
Reviewer: [email or web address] - Yvonne Arnold
 
Notice Issued Date-Time: 2020   7:40
Notice Type: Patient Choice Letter
 
Notice Delivered To: Patient
Relationship to Patient: 
Representative Name: 
 
Delivery Method: HAND - Hand Delivered
Juhi Days:
Prior Verbal Notification: 
 
Recipient Understood Notice: Yes
Recipient Signature: Yes
Med Rec Note Co-signed by Attending:
 
Coverage Notice Comment:  [facility]
 
Last DP export: 20   3:39 p
Patient Name: YNES COLEMAN
 
Encounter #: [id number]
Page [id number]
 
 
 
 
 
Electronically Signed by KELVIN FATIMA on 20 at 1820
 
 
 
 
 
 
**All edits/amendments must be made on the electronic document**
 
DICTATION DATE: 20     : IRINEO  20     
RPT#: [id number]                                DC DATE:20
                                               STATUS: DIS IN  
[facility]
[address] [place] [address]
***END OF REPORT***

## 2020-01-24 NOTE — MORECARE
CASE MANAGEMENT DISCHARGE SUMMARY
 
 
PATIENT: YNES COLEMAN                      UNIT: [id number]
ACCOUNT#: [id number]                       ADM DATE: 20
AGE: 54     : 61  SEX: F            ROOM/BED: D.2204    
AUTHOR: AKUA,DOC                             PHYSICIAN:                               
 
REFERRING PHYSICIAN: NAHED ALVARES MD        
DATE OF SERVICE: 20
Discharge Plan
 
 
Patient Name: YNES COLEMAN
Facility: [facility]:[place]
Encounter #: [id number]
Medical Record #: [id number]
: 1965
Planned Disposition: Home Health Service
Anticipated Discharge Date: 
 
Discharge Date: 2020
Expected LOS: 
Initial Reviewer: [id number]
Initial Review Date: 2020
Generated: 20  12:48 pm 
Comments
 
DCP- Discharge Planning
 
Updated by [id number]: Jaci Young on 20  10:43 am CT
PATIENT DISCHARGED HOME TODAY WITH [facility]. I HAVE FAXED 
CLINICALS TO [facility] AND NOTIFIED NELLY.
DCP- Discharge Planning
 
Updated by [email or web address]: Yvonne Arnold on 20  11:22 am CT
Patient Name: YNES COLEMAN                                     
Admission Status: Elective   
Accout number: [id number]                              
Admission Date: 2020   
: 1965                                                        
Admission Diagnosis:   
Attending: NAHED ALVARES                                               
Current LOS:  3   
  
Anticipated DC Date:    
Planned Disposition: Home Health Service   
Primary Insurance: [facility]   
  
  
 
Discharge Planning Comments:   
  
CM met with patient to complete initial dc planning assessment.  CM educated 
patient on the CM role and verbal consent given by patient to complete 
assessment.   Patient lives at home with her spouse Deandre.  At discharge 
patient plans to return home and feels this is a safe discharge. Patient 
states she has adequate assistance and support from her spouse to return home.
 CM discussed availability of home health, and patient has chosen  [facility] 
[facility].  CM will continue to follow and will assist as needed with dc 
plans/needs.    
  
  
  
  
: Yvonne Arnold
DCP- Discharge Planning
 
Updated by [id number]: Yvonne Arnold on 20   7:21 am CT
CM spoke with patient about H/H. H/H explained- Patient chose [facility] 
[facility]. Copy placed on chart.
DCP- Discharge Planning
 
Updated by [id number]: Yvonne Arnold on 20   2:43 pm CT
Cm spoke with patient about HH.  Patient reluctant to choose facility without 
.  Called Deandre () but unable to reach.  Will attempt 19 
for home health decision.
 DCPIA - Discharge Planning Initial Assessment
 
Updated by [email or web address]: Yvonne Arnold on 20  12:19 pm
*  Is the patient Alert and Oriented?
Yes
*  How many steps to enter\exit or inside your home? 1/0 *  PCP [place] *  Pharmacy [facility]
*  Preadmission Environment
Home with Family
*  ADLs
Independent
*  Equipment
Cane
Elevated Toliet Seat
Glucometer
Rolling Walker
Walker
*  List name and contact numbers for known caregivers / representatives who 
currently or will assist patient after discharge:
Deandre Coleman  [phone]
 
*  Verbal permission to speak to the caregivers and representatives has been 
obtained from the patient.
Yes
*  Community resources currently utilized
None
 
*  Additional services required to return to the preadmission environment?
Yes
*  Can the patient safely return to the preadmission environment?
Yes
*  Has this patient been hospitalized within the prior 30 days at any 
hospital?
No
 
 
 
 
 
Coverage Notice
 
Reviewer: [id number] - Yvonne Arnold
 
Notice Issued Date-Time: 2020   7:40
Notice Type: Patient Choice Letter
 
Notice Delivered To: Patient
Relationship to Patient: 
Representative Name: 
 
Delivery Method: HAND - Hand Delivered
Juhi Days:
Prior Verbal Notification: 
 
Recipient Understood Notice: Yes
Recipient Signature: Yes
Med Rec Note Co-signed by Attending:
 
Coverage Notice Comment:  [facility]
 
Last DP export: 20   2:47 p
Patient Name: YNES COLEMAN
 
Encounter #: [id number]
Page [phone]
 
 
 
 
 
Electronically Signed by KELVIN FATIMA on 20 at 1149
 
 
 
 
 
 
**All edits/amendments must be made on the electronic document**
 
DICTATION DATE: 20 1148     : IRINEO  20 1148     
RPT#: [id number]                                DC DATE:20
                                               STATUS: DIS IN  
[facility]
 [place] [address]
***END OF REPORT***

## 2020-01-24 NOTE — MORECARE
CASE MANAGEMENT DISCHARGE SUMMARY
 
 
PATIENT: YNES COLEMAN                      UNIT: [id number]
ACCOUNT#: [id number]                       ADM DATE: 20
AGE: 54     : 61  SEX: F            ROOM/BED: D.2204    
AUTHOR: AKUA,DOC                             PHYSICIAN:                               
 
REFERRING PHYSICIAN: NAHED ALVARES MD        
DATE OF SERVICE: 20
Discharge Plan
 
 
Patient Name: YNES COLEMAN
Facility: [facility]:[place]
Encounter #: [id number]
Medical Record #: [id number]
: 1965
Planned Disposition: Home Health Service
Anticipated Discharge Date: 
 
Discharge Date: 2020
Expected LOS: 
Initial Reviewer: [email or web address]
Initial Review Date: 2020
Generated: 20   5:38 pm 
Comments
 
DCP- Discharge Planning
 
Updated by [id number]: Yvonne Arnold on 20   3:31 pm CT
[facility] is not in network.  Sent referral to [facility].  Called patient to 
update POC.
DCP- Discharge Planning
 
Updated by [email or web address]: Jaci Young on 20  10:43 am CT
[facility]. I HAVE FAXED 
CLINICALS TO [facility] AND NOTIFIED NELLY.
DCP- Discharge Planning
 
Updated by [id number]: Yvonne Arnold on 20  11:22 am CT
Patient Name: YNES COLEMAN                                     
Admission Status: Elective   
Accout number: [id number]                              
Admission Date: 2020   
: 1965                                                        
Admission Diagnosis:   
Attending: NAHED ALVARES                                               
Current LOS:  3   
  
 
Anticipated DC Date:    
Planned Disposition: Home Health Service   
Primary Insurance: [facility]   
  
  
Discharge Planning Comments:   
  
CM met with patient to complete initial dc planning assessment.  CM educated 
patient on the CM role and verbal consent given by patient to complete 
assessment.   Patient lives at home with her spouse Deandre.  At discharge 
patient plans to return home and feels this is a safe discharge. Patient 
states she has adequate assistance and support from her spouse to return home.
 CM discussed availability of home health, and patient has chosen  [facility].  CM will continue to follow and will assist as needed with dc 
plans/needs.    
  
  
  
  
: Yvonne Arnold
DCP- Discharge Planning
 
Updated by [email or web address]: Yvonne Arnold on 20   7:21 am CT
CM spoke with patient about H/H. H/H explained- Patient chose [facility]. Copy placed on chart.
DCP- Discharge Planning
 
Updated by [id number]: Yvonne Arnold on 20   2:43 pm CT
Cm spoke with patient about HH.  Patient reluctant to choose facility without 
.  Called Deandre () but unable to reach.  Will attempt 19 
for home health decision.
 DCPIA - Discharge Planning Initial Assessment
 
Updated by [email or web address]: Yvonne Arnold on 20  12:19 pm
*  Is the patient Alert and Oriented?
Yes
*  How many steps to enter\exit or inside your home? 1/0 *  PCP [place] *  Pharmacy [facility]
*  Preadmission Environment
Home with Family
*  ADLs
Independent
*  Equipment
Cane
Elevated Toliet Seat
Glucometer
Rolling Walker
Walker
*  List name and contact numbers for known caregivers / representatives who 
currently or will assist patient after discharge:
Deandre Coleman  [phone]
 
 
*  Verbal permission to speak to the caregivers and representatives has been 
obtained from the patient.
Yes
*  Community resources currently utilized
None
*  Additional services required to return to the preadmission environment?
Yes
*  Can the patient safely return to the preadmission environment?
Yes
*  Has this patient been hospitalized within the prior 30 days at any 
hospital?
No
 
 
 
 
 
Coverage Notice
 
Reviewer: [email or web address] - Yvonne Arnold
 
Notice Issued Date-Time: 2020   7:40
Notice Type: Patient Choice Letter
 
Notice Delivered To: Patient
Relationship to Patient: 
Representative Name: 
 
Delivery Method: HAND - Hand Delivered
Juhi Days:
Prior Verbal Notification: 
 
Recipient Understood Notice: Yes
Recipient Signature: Yes
Med Rec Note Co-signed by Attending:
 
Coverage Notice Comment:  [facility]
 
Last DP export: 20   3:30 p
Patient Name: YNES COLEMAN
 
Encounter #: [id number]
Page [id number]
 
 
 
 
 
Electronically Signed by KELVIN FATIMA on 20 at 1639
 
 
 
 
 
 
**All edits/amendments must be made on the electronic document**
 
DICTATION DATE: 20     : IRINEO  20 163     
RPT#: [id number]                                DC DATE:20
                                               STATUS: DIS IN  
[facility]
[address] [place] [address]
***END OF REPORT***

## 2020-05-11 ENCOUNTER — HOSPITAL ENCOUNTER (OUTPATIENT)
Dept: HOSPITAL 84 - D.MAMMO | Age: 55
Discharge: HOME | End: 2020-05-11
Attending: FAMILY MEDICINE
Payer: COMMERCIAL

## 2020-05-11 VITALS — BODY MASS INDEX: 29.2 KG/M2

## 2020-05-11 DIAGNOSIS — Z12.31: Primary | ICD-10-CM

## 2020-09-09 ENCOUNTER — HOSPITAL ENCOUNTER (OUTPATIENT)
Dept: HOSPITAL 84 - D.LABREF | Age: 55
Discharge: HOME | End: 2020-09-09
Attending: CLINICAL NURSE SPECIALIST
Payer: COMMERCIAL

## 2020-09-09 VITALS — BODY MASS INDEX: 29.2 KG/M2

## 2020-09-09 DIAGNOSIS — E11.9: Primary | ICD-10-CM

## 2020-09-09 DIAGNOSIS — M25.562: ICD-10-CM

## 2020-09-09 LAB — EST. AVERAGE GLUCOSE BLD GHB EST-MCNC: 169 MG/DL (ref 74–154)

## 2020-09-28 ENCOUNTER — HOSPITAL ENCOUNTER (INPATIENT)
Dept: HOSPITAL 84 - D.SDCHOLD | Age: 55
LOS: 8 days | Discharge: HOME HEALTH | DRG: 465 | End: 2020-10-06
Attending: ORTHOPAEDIC SURGERY | Admitting: ORTHOPAEDIC SURGERY
Payer: COMMERCIAL

## 2020-09-28 VITALS
BODY MASS INDEX: 28.41 KG/M2 | HEIGHT: 63 IN | BODY MASS INDEX: 28.41 KG/M2 | BODY MASS INDEX: 28.41 KG/M2 | WEIGHT: 160.34 LBS
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
## 2020-09-28 DIAGNOSIS — T84.84XA: Primary | ICD-10-CM

## 2020-09-28 DIAGNOSIS — E11.40: ICD-10-CM

## 2020-09-30 LAB
ANION GAP SERPL CALC-SCNC: 10.9 MMOL/L (ref 8–16)
APTT BLD: 21.9 SECONDS (ref 22.8–39.4)
BASOPHILS NFR BLD AUTO: 0.5 % (ref 0–2)
BILIRUB SERPL-MCNC: NEGATIVE MG/DL
BUN SERPL-MCNC: 16 MG/DL (ref 7–18)
CALCIUM SERPL-MCNC: 9.2 MG/DL (ref 8.5–10.1)
CHLORIDE SERPL-SCNC: 100 MMOL/L (ref 98–107)
CO2 SERPL-SCNC: 28.7 MMOL/L (ref 21–32)
CREAT SERPL-MCNC: 0.7 MG/DL (ref 0.6–1.3)
EOSINOPHIL NFR BLD: 5.4 % (ref 0–7)
ERYTHROCYTE [DISTWIDTH] IN BLOOD BY AUTOMATED COUNT: 13.7 % (ref 11.5–14.5)
GLUCOSE SERPL-MCNC: 307 MG/DL (ref 74–106)
HCT VFR BLD CALC: 38 % (ref 36–48)
HGB BLD-MCNC: 12.6 G/DL (ref 12–16)
IMM GRANULOCYTES NFR BLD: 0.3 % (ref 0–5)
INR PPP: 0.93 (ref 0.85–1.17)
KETONES UR STRIP-MCNC: NEGATIVE MG/DL
LYMPHOCYTES NFR BLD AUTO: 27.5 % (ref 15–50)
MCH RBC QN AUTO: 29.6 PG (ref 26–34)
MCHC RBC AUTO-ENTMCNC: 33.2 G/DL (ref 31–37)
MCV RBC: 89.4 FL (ref 80–100)
MONOCYTES NFR BLD: 7.5 % (ref 2–11)
NEUTROPHILS NFR BLD AUTO: 58.8 % (ref 40–80)
NITRITE UR-MCNC: NEGATIVE MG/ML
OSMOLALITY SERPL CALC.SUM OF ELEC: 282 MOSM/KG (ref 275–300)
PH UR STRIP: 5 [PH] (ref 5–8)
PLATELET # BLD: 292 10X3/UL (ref 130–400)
PMV BLD AUTO: 9.8 FL (ref 7.4–10.4)
POTASSIUM SERPL-SCNC: 4.6 MMOL/L (ref 3.5–5.1)
PROTHROMBIN TIME: 12.4 SECONDS (ref 11.6–15)
RBC # BLD AUTO: 4.25 10X6/UL (ref 4–5.4)
SODIUM SERPL-SCNC: 135 MMOL/L (ref 136–145)
UROBILINOGEN UR-MCNC: NORMAL MG/DL (ref ?–2)
WBC # BLD AUTO: 5.9 10X3/UL (ref 4.8–10.8)

## 2020-10-05 VITALS — SYSTOLIC BLOOD PRESSURE: 116 MMHG | DIASTOLIC BLOOD PRESSURE: 61 MMHG

## 2020-10-05 VITALS — SYSTOLIC BLOOD PRESSURE: 111 MMHG | DIASTOLIC BLOOD PRESSURE: 63 MMHG

## 2020-10-05 VITALS — SYSTOLIC BLOOD PRESSURE: 97 MMHG | DIASTOLIC BLOOD PRESSURE: 48 MMHG

## 2020-10-05 VITALS — SYSTOLIC BLOOD PRESSURE: 133 MMHG | DIASTOLIC BLOOD PRESSURE: 62 MMHG

## 2020-10-05 VITALS — DIASTOLIC BLOOD PRESSURE: 60 MMHG | SYSTOLIC BLOOD PRESSURE: 136 MMHG

## 2020-10-05 VITALS — DIASTOLIC BLOOD PRESSURE: 71 MMHG | SYSTOLIC BLOOD PRESSURE: 131 MMHG

## 2020-10-05 VITALS — DIASTOLIC BLOOD PRESSURE: 51 MMHG | SYSTOLIC BLOOD PRESSURE: 99 MMHG

## 2020-10-05 VITALS — SYSTOLIC BLOOD PRESSURE: 137 MMHG | DIASTOLIC BLOOD PRESSURE: 68 MMHG

## 2020-10-05 VITALS — SYSTOLIC BLOOD PRESSURE: 101 MMHG | DIASTOLIC BLOOD PRESSURE: 56 MMHG

## 2020-10-05 VITALS — SYSTOLIC BLOOD PRESSURE: 106 MMHG | DIASTOLIC BLOOD PRESSURE: 53 MMHG

## 2020-10-05 VITALS — DIASTOLIC BLOOD PRESSURE: 53 MMHG | SYSTOLIC BLOOD PRESSURE: 100 MMHG

## 2020-10-05 VITALS — SYSTOLIC BLOOD PRESSURE: 120 MMHG | DIASTOLIC BLOOD PRESSURE: 61 MMHG

## 2020-10-05 PROCEDURE — 0SRD0MZ REPLACEMENT OF LEFT KNEE JOINT WITH LATERAL UNICONDYLAR SYNTHETIC SUBSTITUTE, OPEN APPROACH: ICD-10-PCS | Performed by: ORTHOPAEDIC SURGERY

## 2020-10-05 PROCEDURE — 0SPD0MZ REMOVAL OF LATERAL UNICONDYLAR SYNTHETIC SUBSTITUTE FROM LEFT KNEE JOINT, OPEN APPROACH: ICD-10-PCS | Performed by: ORTHOPAEDIC SURGERY

## 2020-10-05 NOTE — NUR
ASSISTED PATIENT ON AND OFF BEDPAN. PATIENT VOIDED. VSS. PLACED ICE TO
PATIENT'S LEFT KNEE. PATIENT DENIES OTHER NEEDS AT THIS TIME. BED IN LOWEST
POSITION AND CALL LIGHT WITHIN REACH. ENCOURAGED THE PATIENT TO CALL IF SHE
HAS NEEDS. WILL CONTINUE TO MONITOR.

## 2020-10-05 NOTE — NUR
RECEIVED TO ROOM 1209 VIA BED FROM PACU. A/O X3. DENIES PAIN. ALERT AND
ORIENTED. DRESSING TO LEFT KNEE DRY AND INTACT.

## 2020-10-05 NOTE — NUR
THROUGH TRAFFIC KEPT TO A MINIMUM.HIBACLENS AND ALCOHOL USED TO CLEAN
BEFORE PREPPING. STERILE GOWNED AND GLOVES TO PREP WITH CHLORAPREP.

## 2020-10-06 VITALS — SYSTOLIC BLOOD PRESSURE: 132 MMHG | DIASTOLIC BLOOD PRESSURE: 59 MMHG

## 2020-10-06 VITALS — SYSTOLIC BLOOD PRESSURE: 101 MMHG | DIASTOLIC BLOOD PRESSURE: 50 MMHG

## 2020-10-06 VITALS — SYSTOLIC BLOOD PRESSURE: 135 MMHG | DIASTOLIC BLOOD PRESSURE: 63 MMHG

## 2020-10-06 VITALS — SYSTOLIC BLOOD PRESSURE: 103 MMHG | DIASTOLIC BLOOD PRESSURE: 57 MMHG

## 2020-10-06 LAB
ANION GAP SERPL CALC-SCNC: 11.3 MMOL/L (ref 8–16)
BUN SERPL-MCNC: 17 MG/DL (ref 7–18)
CALCIUM SERPL-MCNC: 8.5 MG/DL (ref 8.5–10.1)
CHLORIDE SERPL-SCNC: 104 MMOL/L (ref 98–107)
CO2 SERPL-SCNC: 29 MMOL/L (ref 21–32)
CREAT SERPL-MCNC: 0.8 MG/DL (ref 0.6–1.3)
ERYTHROCYTE [DISTWIDTH] IN BLOOD BY AUTOMATED COUNT: 14.2 % (ref 11.5–14.5)
GLUCOSE SERPL-MCNC: 205 MG/DL (ref 74–106)
HCT VFR BLD CALC: 34.3 % (ref 36–48)
HGB BLD-MCNC: 10.9 G/DL (ref 12–16)
MCH RBC QN AUTO: 29.3 PG (ref 26–34)
MCHC RBC AUTO-ENTMCNC: 31.8 G/DL (ref 31–37)
MCV RBC: 92.2 FL (ref 80–100)
OSMOLALITY SERPL CALC.SUM OF ELEC: 286 MOSM/KG (ref 275–300)
PLATELET # BLD: 246 10X3/UL (ref 130–400)
PMV BLD AUTO: 10.2 FL (ref 7.4–10.4)
POTASSIUM SERPL-SCNC: 4.3 MMOL/L (ref 3.5–5.1)
RBC # BLD AUTO: 3.72 10X6/UL (ref 4–5.4)
SODIUM SERPL-SCNC: 140 MMOL/L (ref 136–145)
WBC # BLD AUTO: 7 10X3/UL (ref 4.8–10.8)

## 2020-10-06 NOTE — NUR
dressing removed.  staples clean and intact. no drainage noted. new aquacel
dressing placed and spouse shown how to apply. 2 other dressings given to
spouse for change at home. iv removed with tip intact. dressing applied

## 2020-10-06 NOTE — NUR
SPOKE WITH MARY ANN . SHE STATED SHE HAD TALKED WITH THE PATIENT OVER
THE PHONE AND THEY HAD AGREED TO USE [facility]  AND SHE WOULD ARRANGE IT/

## 2020-10-06 NOTE — MORECARE
CASE MANAGEMENT DISCHARGE SUMMARY
 
 
PATIENT: YNES GONZALES                      UNIT: [id number]
ACCOUNT#: [id number]                       ADM DATE: 10/05/20
AGE: 54     : 61  SEX: F            ROOM/BED: D.1209    
AUTHOR: AKUA,DOC                             PHYSICIAN:                               
 
REFERRING PHYSICIAN: NAHED ALVARES MD        
DATE OF SERVICE: 10/06/20
Discharge Plan
 
 
Patient Name: YNES GONZALES
Facility: [facility]:[place]
Encounter #: [id number]
Medical Record #: [id number]
: 1965
Planned Disposition: Home with Home Health
Anticipated Discharge Date: 
 
Discharge Date: 
Expected LOS: 
Initial Reviewer: [email or web address]
Initial Review Date: 10/05/2020
Generated: 10/6/20   3:26 pm 
Comments
 
DCP- Discharge Planning
 
Updated by [id number]: Jaci Young on 10/6/20   1:11 pm CT
Patient Name: YNES GONZALES                                     
Admission Status: Elective   
Accout number: [id number]                              
Admission Date: 10-   
: 1965                                                        
Admission Diagnosis:PAIN DUE TO INTERNAL ORTHOPEDIC PROSTH DEV/GRFT, INIT   
Attending: NAHED ALVARES                                               
Current LOS:  1   
  
Anticipated DC Date:    
Planned Disposition: Home with Home Health   
Primary Insurance: [facility]   
  
  
Discharge Planning Comments:   
CM met with patient to complete initial dc planning assessment.  CM educated 
patient on the CM role and verbal consent given by patient to complete 
assessment.   Patient lives at home with  where she is independent 
with her care.  At discharge patient plans to return  home and feels this is 
 
a safe discharge. Deandre will be her  home today.  CM discussed 
availability of home health, rehab services, and medical equipment. She 
stated that she has used [facility]  in the past and would like to use them 
again. (PT ELISA) She has all of her DME. I will send clinical over to [facility] 
.  Patient denied known discharge needs at this time. CM will continue to 
follow and will assist as needed with dc plans/needs.    
  
  
  
  
  
: Jaci Young
 DCPIA - Discharge Planning Initial Assessment
 
Updated by [id number]: Jaci Young on 10/6/20   2:07 pm
*  Is the patient Alert and Oriented?
Yes
*  How many steps to enter\exit or inside your home? RAMP *  PCP [facility] *  Pharmacy [facility]
*  Preadmission Environment
Home with Family
*  ADLs
Independent
*  Equipment
Bedside Commode
Cane
Walker
*  List name and contact numbers for known caregivers / representatives who 
currently or will assist patient after discharge:
DEANDRE GONZALES [phone]
*  Verbal permission to speak to the caregivers and representatives has been 
obtained from the patient.
N/A
*  Community resources currently utilized
None
*  Additional services required to return to the preadmission environment?
Yes
*  Can the patient safely return to the preadmission environment?
Yes
*  Has this patient been hospitalized within the prior 30 days at any 
hospital?
No
 
External Providers
[facility]
 
Next Contact Date: 
Service Request Date: 
Service Type: 
Resolution: 
 
Reviewer: 
Comments: 
 
 
 
 
 
Coverage Notice
 
Reviewer: [email or web address] Mekhi Young
 
Notice Issued Date-Time: 10/06/2020  14:10
Notice Type: Patient Choice Letter
 
Notice Delivered To: Patient
Relationship to Patient: 
Representative Name: 
 
Delivery Method: PHONE - Phone
Juhi Days:
Prior Verbal Notification: Yes
 
Recipient Understood Notice: 
Recipient Signature: 
Med Rec Note Co-signed by Attending:
 
Coverage Notice Comment:  radha with [facility]  via phone
Patient Name: YNES GONZALES
Encounter #: [id number]
Page [phone]
 
 
 
 
 
Electronically Signed by KELVIN FATIMA on 10/06/20 at 1427
 
 
 
 
 
 
**All edits/amendments must be made on the electronic document**
 
DICTATION DATE: 10/06/20 1426     : IRINEO  10/06/20 1426     
RPT#: [id number]                                DC DATE:        
                                               STATUS: ADM IN  
[facility]
 [place] [address]
***END OF REPORT***

## 2020-10-06 NOTE — NUR
AWAKE AND ALERT. ORIENTED X3. ASSISTED WITH BED BATES PER STAFF. VOIDED A LARGE
VOLUMN OF CLEAR YELLOW URINE. REQUESTED AND GIVEN ONE HYDROCODONE PO FOR C/O
LEFT KNEE PAIN LEVEL 8. WILL MONITOR. LUNGS ARE CLEAR BUT DIMINISHED
THROUGHOUT LUNG SCHWAB. NO COUGH NOTED. REPORTED USED IS AS INSTRUCTED. IV TO
RIGHT FOREARM IS PATENT WITHOUT REDNESS AT INSERTION SITE. DENIES NEEDS.

## 2020-10-06 NOTE — NUR
DISCHARGED TO HOME AMBULATORY WITH . DISCHARGE INSTRUCTIONS GIVEN
BOTH VERBALLY AND WRITTEN. ALL QUESTIONS ANSWERED. PATIENT AND 
VERBALIZED UNDERSTANDING OF SAME. ALL BELONGINGS WITH PATIENT.

## 2020-10-06 NOTE — NUR
AWAKE AND ALERT. ORIENTED X3. C/O PAIN LEVEL "CRYING" AT THIS TIME. BP UP TO
107. GIVEN ONE HYDROCODONE PO FOR PAIN WILL MONITOR. LUNGS ARE CLEAR
BILATERALLY, NO COUGH NOTED. REPORTED USING IS AS INSTRUCTED. SKIN IS INTACT
WITHOUT REDNESS EXCEPT INCISION TO LEFT KNEE WHICH HAS A DRY INTACT DRESSING
IN PLACE. IV TO RIGHT FOREARM IS PATENT WITHOUT REDNESS AT INSERTION SITE.
DENIES NEEDS.

## 2020-10-07 NOTE — MORECARE
CASE MANAGEMENT DISCHARGE SUMMARY
 
 
PATIENT: YNES GONZALES                      UNIT: [id number]
ACCOUNT#: [id number]                       ADM DATE: 10/05/20
AGE: 54     : 61  SEX: F            ROOM/BED: D.1209    
AUTHOR: AKUA,DOC                             PHYSICIAN:                               
 
REFERRING PHYSICIAN: NAHED ALVARES MD        
DATE OF SERVICE: 10/07/20
Discharge Plan
 
 
Patient Name: YNES GONZALES
Facility: [facility]:[place]
Encounter #: [id number]
Medical Record #: [id number]
: 1965
Planned Disposition: Home with Home Health
Anticipated Discharge Date: 
 
Discharge Date: 10/06/2020
Expected LOS: 
Initial Reviewer: [email or web address]
Initial Review Date: 10/05/2020
Generated: 10/7/20  10:18 am 
DCP- Discharge Planning
 
Updated by [email or web address]: Jaci Young on 10/6/20   1:11 pm CT
Patient Name: YNES GONZALES                                     
Admission Status: Elective   
Accout number: [id number]                              
Admission Date: 10-   
: 1965                                                        
Admission Diagnosis:PAIN DUE TO INTERNAL ORTHOPEDIC PROSTH DEV/GRFT, INIT   
Attending: NAHED ALVARES                                               
Current LOS:  1   
  
Anticipated DC Date:    
Planned Disposition: Home with Home Health   
Primary Insurance: [facility]   
  
  
Discharge Planning Comments:   
CM met with patient to complete initial dc planning assessment.  CM educated 
patient on the CM role and verbal consent given by patient to complete 
assessment.   Patient lives at home with  where she is independent 
with her care.  At discharge patient plans to return  home and feels this is 
a safe discharge. Deandre will be her  home today.  CM discussed 
availability of home health, rehab services, and medical equipment. She 
 
stated that she has used [facility]  in the past and would like to use them 
again. (PT ELISA) She has all of her DME. I will send clinical over to [facility] 
.  Patient denied known discharge needs at this time. CM will continue to 
follow and will assist as needed with dc plans/needs.    
  
  
  
  
  
: Jaci Young
 DCPIA - Discharge Planning Initial Assessment
 
Updated by [id number]: Jaci Young on 10/6/20   2:07 pm
*  Is the patient Alert and Oriented?
Yes
*  How many steps to enter\exit or inside your home? RAMP *  PCP [place] *  Pharmacy [facility]
*  Preadmission Environment
Home with Family
*  ADLs
Independent
*  Equipment
Bedside Commode
Cane
Walker
*  List name and contact numbers for known caregivers / representatives who 
currently or will assist patient after discharge:
DEANDRE GONZALES [phone]
*  Verbal permission to speak to the caregivers and representatives has been 
obtained from the patient.
N/A
*  Community resources currently utilized
None
*  Additional services required to return to the preadmission environment?
Yes
*  Can the patient safely return to the preadmission environment?
Yes
*  Has this patient been hospitalized within the prior 30 days at any 
hospital?
No
 
 
 
 
 
Coverage Notice
 
Reviewer: [email or web address] Mekhi Young
 
Notice Issued Date-Time: 10/06/2020  14:10
Notice Type: Patient Choice Letter
 
Notice Delivered To: Patient
 
Relationship to Patient: 
Representative Name: 
 
Delivery Method: PHONE - Phone
Juhi Days:
Prior Verbal Notification: Yes
 
Recipient Understood Notice: 
Recipient Signature: 
Med Rec Note Co-signed by Attending:
 
Coverage Notice Comment:  [facility] with [facility] via phone
 
Last DP export: 10/6/20   1:27 p
Patient Name: YNES GONZALES
Encounter #: [id number]
Page [phone]
 
 
 
 
 
Electronically Signed by KELVIN FATIMA on 10/07/20 at 0919
 
 
 
 
 
 
**All edits/amendments must be made on the electronic document**
 
DICTATION DATE: 10/07/20 0918     : IRINEO  10/07/20 0918     
RPT#: [id number]                                DC DATE:10/06/20
                                               STATUS: DIS IN  
[facility]
[address] [place] [address]
***END OF REPORT***

## 2021-05-17 ENCOUNTER — HOSPITAL ENCOUNTER (OUTPATIENT)
Dept: HOSPITAL 84 - D.MAMMO | Age: 56
Discharge: HOME | End: 2021-05-17
Attending: FAMILY MEDICINE
Payer: COMMERCIAL

## 2021-05-17 VITALS — BODY MASS INDEX: 28.4 KG/M2

## 2021-05-17 DIAGNOSIS — Z12.31: Primary | ICD-10-CM
